# Patient Record
Sex: MALE | Race: WHITE | Employment: FULL TIME | URBAN - METROPOLITAN AREA
[De-identification: names, ages, dates, MRNs, and addresses within clinical notes are randomized per-mention and may not be internally consistent; named-entity substitution may affect disease eponyms.]

---

## 2019-12-26 ENCOUNTER — HOSPITAL ENCOUNTER (OUTPATIENT)
Dept: PHYSICAL THERAPY | Age: 54
Discharge: HOME | End: 2019-12-26
Payer: COMMERCIAL

## 2019-12-26 DIAGNOSIS — M54.32 BACK PAIN WITH LEFT-SIDED SCIATICA: ICD-10-CM

## 2019-12-26 DIAGNOSIS — M62.838 MUSCLE SPASM: ICD-10-CM

## 2019-12-26 PROCEDURE — 97110 THERAPEUTIC EXERCISES: CPT

## 2019-12-26 PROCEDURE — 97162 PT EVAL MOD COMPLEX 30 MIN: CPT

## 2019-12-26 NOTE — PROGRESS NOTES
Abdullahi Bhardwaj  : 1965  Payor: Sanna Parisi / Plan: [place] [facility] / Product Type: PPO /  [address] at [address], [place], [address] [place] [address]  [phone]   [phone]                                                               OUTPATIENT PHYSICAL THERAPY: Daily Treatment Note 2019   Visit Count:  1    ICD-10: Treatment Diagnosis:    Low back pain (M54.5)                             Sciatica, left side (M54.32)                Precautions/Allergies:   Betadine [povidone-iodine]   Fall Risk Score: 1 (? 5 = High Risk)  MD Orders: Eval and Treat  MEDICAL/REFERRING DIAGNOSIS:  Back pain with left-sided sciatica [M54.32]  Muscle spasm [M62.838]   DATE OF ONSET: 12/15/19  REFERRING PHYSICIAN: Rosalinda Suarez MD  RETURN PHYSICIAN APPOINTMENT: TBD         Pre-treatment Symptoms/Complaints:  Left sciatica  Pain: Initial:   4 Post Session:  3/10   Medications Last Reviewed:  2019   Updated Objective Findings: see evaluation   TREATMENT:   Therapeutic Exercises: (15 minutes):  Exercises per grid below to improve mobility, strength and balance. Required minimal visual, verbal and manual cues to promote proper body alignment and promote proper body posture. Progressed resistance and complexity of movement as indicated. Date:  19 Date:   Date:     Activity/Exercise Parameters Parameters Parameters   Patient education Treatment rational, expectations, POC, HEP     Prone on elbows 3 min     pressups 10 x     Standing extension 5 x     Supine sciatic nerve glide 20 x ea side     Seated slump nerve glide 10 x ea     Standing sciatic nerve glide 10 x left          Manual Therapy Interventions: (0 minutes): Manual interventions performed to improve joint/soft tissue mobility and ROM to improve ability to perform ADLs. Patient responded well to all manual interventions with no significant increase in pain/symptoms.  Improved pain reported below. Technique Used Grade  Level # Time(s) Effect while being performed                                                         [facility]  Treatment/Session Summary: understands program  · Response to Treatment:  No increase in pain or adverse reactions  · Communication/Consultation:  Faxed initial evaluation to MD  · Equipment provided today:  HEP. Recommendations/Intent for next treatment session: Next visit will focu  s on extension based program, sciatic nerve mobilization, encourage daily walking, continue all current work tasks.     Total Treatment Billable Duration:  15 min Rx 30 min evaluation  PT Patient Time In/Time Out  Time In: 1112  Time Out: 0830  Kimmie Cardenas RT    Future Appointments   Date Time Provider Koffi Joann   12/31/2019  9:30 AM Gregory Frank MD [facility]

## 2019-12-26 NOTE — THERAPY EVALUATION
Bernie Francois  : 1965      Payor: Thomas Reddy / [place]: [place] [facility] / Product Type: PPO /  [address] at [address], [place], [id number] [place] [address]  [phone]   [phone]       OUTPATIENT PHYSICAL THERAPY:Initial Assessment 2019    ICD-10: Treatment Diagnosis:    Low back pain (M54.5)        Sciatica, left side (M54.32)             Precautions/Allergies:   Betadine [povidone-iodine]   Fall Risk Score: 1 (? 5 = High Risk)  MD Orders: Eval and Treat  MEDICAL/REFERRING DIAGNOSIS:  Back pain with left-sided sciatica [M54.32]  Muscle spasm [M62.838]   DATE OF ONSET: 12/15/19  REFERRING PHYSICIAN: Yelitza Coates MD  RETURN PHYSICIAN APPOINTMENT: TBD     INITIAL ASSESSMENT:  Mr. Bernie Francois presents to physical therapy with decreased  ROM, joint mobility, flexibility, functional mobility, and increased pain. Patient complains of left sciatica increased by prolonged sitting and relieved by walking. These S/S are consistent with referring diagnosis. Bernie Francois will benefit from skilled physical therapy (medically necessary) to address above deficits affecting participation in basic ADLs and functional mobility/tolerance. Patient will benefit from manual therapeutic techniques (stretching, joint mobilizations, soft tissue mobilization/myofascial release), therapeutic exercises and activities, including a comprehensive home exercises program to address current impairments and functional limitations. PROBLEM LIST (Impacting functional limitations):  1. Decreased sitting tolerance  2. Decreased ADL/Functional Activities  3. Decreased Balance  4. Increased Pain  5. Decreased Activity Tolerance  6. Decreased Flexibility/Joint Mobility  7. Decreased [place] with Home Exercise Program INTERVENTIONS PLANNED:  1. Patient Education  2. Home Exercise Program (HEP)  3. Manual Therapy  4.  Neuromuscular Re-education/Strengthening  5. Range of Motion (ROM)  6. Therapeutic Activites  7. Therapeutic Exercise/Strengthening     TREATMENT PLAN:  Effective Dates: 12/26/19 TO 2/24/2020 (60 days). Frequency/Duration: 2 times a week for 60 Days  GOALS: (Goals have been discussed and agreed upon with patient.)  Short Term Goals 4 weeks   1. Kenya Garrett will be independent with HEP  2. Kenya Garrett will participate in LE stretching program to increase flexibility  3. Kenya Garrett will be able to sleep through night  4. Kenya Garrett will be able to sit more than a hour with minimal pain  5. Kenya Garrett will tolerate manual therapy/joint mobilizations/soft tissue to increase ROM and decrease pain  6. Kenya Garrett  Will be able to lift boxes at work without difficulty  Long Term Goals 8 weeks   1. Kenya Garrett will demonstrate a 10 point improvement on the Oswestry to show improvement in function  2. Kenya Garrett will report 0/10 pain at rest and during ADLs  3. Kenya Garrett will demonstrate 5/5 LE strength on manual muscle testing  4. Kenya Garrett will be able to perform all job demands without difficulty. Outcome Measure: Tool Used: Modified Oswestry Low Back Pain Questionnaire  Score:  Initial: 19/50  Most Recent: X/50 (Date: -- )   Interpretation of Score: Each section is scored on a 0-5 scale, 5 representing the greatest disability. The scores of each section are added together for a total score of 50. Medical Necessity:   · Skilled intervention continues to be required due to above deficits affecting participation in basic ADLs and overall functional tolerance. Reason for Services/Other Comments:  · Patient continues to require skilled intervention due to  above deficits affecting participation in basic ADLs and overall functional tolerance.     Total Treatment Duration:  PT Patient Time In/Time Out  Time In: 2911  Time Out: 0061    Rehabilitation Potential For Stated Goals: GOOD  Regarding Kit Menjivar's therapy, I certify that the treatment plan above will be carried out by a therapist or under their direction. Thank you for this referral,  RT Trevin     Referring Physician Signature: Columba Ramírez MD              Date                    HISTORY:   History of Present Injury/Illness (Reason for Referral): Woke up with severe muscle spasm left side of back two weeks. Also has left sciatica to lateral toes. Went to the ER on 12/15/19 due to 10/10 pain was given ibuprofen, tramadol, flexeril. Went to his MD on 12/17/19. Pain is gradually lessening, but is having only 4 hours of sleep. Has not had diagnostic testing at this time. No longer taking the Tramadol.    -Present symptoms/complaints (on day of evaluation) upper calf pain at this time. Pain Scale:  · Current: 4/10  · Best: 2/10  · Worst: 10+/10      · Aggravating factors: sitting, prolonged driving. · Relieving factors: walking      Past Medical History/Comorbidities:   Mr. Aniket Pantoja  has no past medical history on file. Mr. Aniket Pantoja  has a past surgical history that includes hx orthopaedic (Left); hx orthopaedic (Left); hx appendectomy; and hx other surgical.  Social History/Living Environment:    works for [employer] full time. If he has a lot of stops close together and he is getting out a lot he is doing ok.     Social History     Socioeconomic History    Marital status:      Spouse name: Not on file    Number of children: Not on file    Years of education: Not on file    Highest education level: Not on file   Occupational History    Occupation:      Employer: [address] [place] [address] Financial resource strain: Not on file    Food insecurity:     Worry: Not on file     Inability: Not on file    Transportation needs:     Medical: Not on file     Non-medical: Not on file   Tobacco Use    Smoking status: Current Every Day Smoker     Packs/day: 0.50    Smokeless tobacco: Never Used Substance and Sexual Activity    Alcohol use: Yes     Frequency: Never     Drinks per session: 1 or 2     Binge frequency: Never     Comment: very rare    Drug use: No    Sexual activity: Not on file   Lifestyle    Physical activity:     Days per week: Not on file     Minutes per session: Not on file    Stress: Not on file   Relationships    Social connections:     Talks on phone: Not on file     Gets together: Not on file     Attends Faith service: Not on file     Active member of club or organization: Not on file     Attends meetings of clubs or organizations: Not on file     Relationship status: Not on file    Intimate partner violence:     Fear of current or ex partner: Not on file     Emotionally abused: Not on file     Physically abused: Not on file     Forced sexual activity: Not on file   Other Topics Concern    Not on file   Social History Narrative    Not on file     Prior Level of Function/Work/Activity:  Has worked as a  for two years. No previous history of low back pain. Previous Treatment Approach  medication    Active Ambulatory Problems     Diagnosis Date Noted    No Active Ambulatory Problems     Resolved Ambulatory Problems     Diagnosis Date Noted    No Resolved Ambulatory Problems     No Additional Past Medical History     Note: Patient denies any increase of symptoms with cough, sneeze or valsalva. Patient denies any saddle paresthesia or bowel/bladder deficits. Current Medications:    Current Outpatient Medications:     cyclobenzaprine (FLEXERIL) 10 mg tablet, Take 10 mg by mouth., Disp: , Rfl:     predniSONE (DELTASONE) 10 mg tablet, , Disp: , Rfl:     traMADol (ULTRAM) 50 mg tablet, Take 1 Tab by mouth nightly as needed for Pain for up to 10 days. Max Daily Amount: 50 mg.  Indications: pain, Disp: 10 Tab, Rfl: 0      Ambulatory/Rehab Services H2 Model Falls Risk Assessment    Risk Factors:       (1)  Gender [Male] Ability to Rise from Chair:       (0) Ability to rise in a single movement    Falls Prevention Plan:       No modifications necessary   Total: (5 or greater = High Risk): 1    ©2010 [facility] [employer]. All Rights Reserved. [employer] [place] Patent #[id number]. Federal Law prohibits the replication, distribution or use without written permission from [facility] [place]       Date Last Reviewed:  12/26/2019   Number of Personal Factors/Comorbidities that affect the Plan of Care: 1-2: MODERATE COMPLEXITY   EXAMINATION:   Observation/Orthostatic Postural Assessment:          Normal postural presentation  Palpation:          Increased tone L>R lumbar paravertebrals but no pain. ROM:            AROM/PROM         Joint: Eval Date: 12/26/19  Re-Assess Date:  Re-Assess Date:    Active ROM RIGHT LEFT RIGHT LEFT RIGHT LEFT   Knee Extension WNL WNL       Knee Flexion WNL WNL       Hip Flexion         Hip Abduction         Lumbar Flexion 55 tightness        Lumbar Extension 20        Lumbar Side-bending 30 25       Lumbar Rotation           Strength:     Eval Date:  Re-Assess Date:  Re-Assess Date:      RIGHT LEFT RIGHT LEFT RIGHT LEFT   Knee Flexion (L5-S2) 5/5 5/5       Knee Extension (L3, L4) 5/5 5/5       Hip Flexion (L1, L2) 4/5 4/5       Hip Extension         Hip Abduction (L5, S1) 4/5 4/5       Ankle Dorsiflexion  5/5 5/5       Great Toe Extension (L5) 5 5       Ankle Plantar Flexion (S1-S2) 5 5           Single leg stance right/left: WNL              Deep squat: limited by calf tightness    Ham 90/90:   RIGHT LE: -30   LEFT LE: -45    Special Tests:    · SLR (<60 degrees)= right 60, left 45  · SLUMP= positive left      Joint Mobility Eval Date:  Re-Assess Date:  Re-Assess Date:     RIGHT LEFT RIGHT LEFT RIGHT LEFT   Lumbar Hypomobile L5S1 hypomobile       Hip WNL WNL         Neurological Screen:    RADIATING SYMPTOMS?: YES  · L5-S1 (Lateral foot)=   Functional Mobility:  Affecting participation in basic ADLs and functional tasks.      Body Structures Involved:    1. Joints   Body Functions Affected:    1. Neuromusculoskeletal  2. Movement Related Activities and Participation Affected:  1.  Bending, lifting, sitting, sleeping     Number of elements that affect the Plan of Care: 3: MODERATE COMPLEXITY   CLINICAL PRESENTATION:   Presentation: Evolving clinical presentation with changing clinical characteristics: MODERATE COMPLEXITY   CLINICAL DECISION MAKING:      Use of outcome tool(s) and clinical judgement create a POC that gives a: Clear prediction of patient's progress: LOW COMPLEXITY     See associated treatment note for treatment provided today      Future Appointments   Date Time Provider Koffi Contehi   12/31/2019  9:30 AM Mario Workman MD [facility]         Jules Montanez, PT C[facility]

## 2019-12-31 ENCOUNTER — HOSPITAL ENCOUNTER (OUTPATIENT)
Dept: PHYSICAL THERAPY | Age: 54
Discharge: HOME | End: 2019-12-31
Payer: COMMERCIAL

## 2019-12-31 PROCEDURE — 97110 THERAPEUTIC EXERCISES: CPT

## 2019-12-31 PROCEDURE — 97140 MANUAL THERAPY 1/> REGIONS: CPT

## 2019-12-31 NOTE — PROGRESS NOTES
Tayler Dodson  : 1965  Payor: Scout Fong / Plan: [facility] / Product Type: PPO /  Margret President at [address] [place] [address]. [address], [facility], [address] [place] [address]  [phone]   [phone]                                                               OUTPATIENT PHYSICAL THERAPY: Daily Treatment Note 2019   Visit Count:  2    ICD-10: Treatment Diagnosis:    Low back pain (M54.5)                             Sciatica, left side (M54.32)                Precautions/Allergies:   Betadine [povidone-iodine]   Fall Risk Score: 1 (? 5 = High Risk)  MD Orders: Eval and Treat  MEDICAL/REFERRING DIAGNOSIS:  Back pain with left-sided sciatica [M54.32]  Muscle spasm [M62.838]   DATE OF ONSET: 12/15/19  REFERRING PHYSICIAN: Wolf Garrison MD  RETURN PHYSICIAN APPOINTMENT: TBD         Pre-treatment Symptoms/Complaints: Pt reports a centralization of symptoms into his buttock and and back with no numbness in his toes. Pain: Initial:   2 Post Session:  1/10   Medications Last Reviewed:  2019   Updated Objective Findings:MIld lateral shift to the left     TREATMENT:   Therapeutic Exercises: (50 minutes):  Exercises per grid below to improve mobility, strength and balance. Required minimal visual, verbal and manual cues to promote proper body alignment and promote proper body posture. Progressed resistance and complexity of movement as indicated. Date:  19 Date:  2019 Date:     Activity/Exercise Parameters Parameters Parameters   Patient education Treatment rational, expectations, POC, HEP 8 min education on dosage of extension nerve glides.     Prone on elbows 3 min 6 min    pressups 10 x 8 min    Standing extension 5 x 25xs against table    Supine sciatic nerve glide 20 x ea side 30 seconds 3 times with education on dosage    Seated slump nerve glide 10 x ea     Standing sciatic nerve glide 10 x left     Lateral shifting  15xs    Lateral walking  Orange band 5 x's 20 ft    Standing hip extension  Orange band 30 xs bilaterally    rows  Purple band 30 xs         Manual Therapy Interventions: (8 minutes): Manual interventions performed to improve joint/soft tissue mobility and ROM to improve ability to perform ADLs. Patient responded well to all manual interventions with no significant increase in pain/symptoms. Improved pain reported below. Technique Used Grade  Level # Time(s) Effect while being performed   Lumbar II,III, V Lumbar 5 Good cavitation improved extension   PA with supine lumbar extension II,III lumbar 3 Improved extension                                           [employer] Portal  Treatment/Session Summary:   · Response to Treatment: improved with extension oriented exercises. Improved extension after manual and educated on dosage of sciatic nerve glides. Demonstrated centralization  With extension. · Communication/Consultation: none  · Equipment provided today: none  ·   Recommendations/Intent for next treatment session: Continuation of Extension Exercises.     Total Treatment Billable Duration:  58 min Rx  PT Patient Time In/Time Out  Time In: 7141  Time Out: Chula Goins 3, PT    Future Appointments   Date Time Provider Koffi David   1/3/2020  7:30 AM Papenfuss, Oley Landau, [facility]   1/7/2020  7:30 AM Symone Rivera, PT [facility]   1/10/2020  7:30 AM Symone Rivera, PT [facility]   1/14/2020  7:30 AM Symone Rivera, PT [facility]   1/17/2020  7:30 AM Symone Rivera, PT [facility]   1/21/2020  7:30 AM Symone Rivera, PT [facility]   1/24/2020  7:30 AM Symone Rivera, PT [facility]

## 2020-01-03 ENCOUNTER — HOSPITAL ENCOUNTER (OUTPATIENT)
Dept: PHYSICAL THERAPY | Age: 55
Discharge: HOME | End: 2020-01-03
Payer: COMMERCIAL

## 2020-01-03 PROCEDURE — 97110 THERAPEUTIC EXERCISES: CPT

## 2020-01-03 PROCEDURE — 97140 MANUAL THERAPY 1/> REGIONS: CPT

## 2020-01-03 NOTE — PROGRESS NOTES
Tayler Dodson  : 1965  Payor: Scout Fong / Plan: [facility] / Product Type: O /  [address] [facility] at [address] [place] [address]. [facility], [address], [facility], [address] [place] [address]  [phone]   [phone]                                                               OUTPATIENT PHYSICAL THERAPY: Daily Treatment Note 1/3/2020   Visit Count:  3    ICD-10: Treatment Diagnosis:    Low back pain (M54.5)                             Sciatica, left side (M54.32)                Precautions/Allergies:   Betadine [povidone-iodine]   Fall Risk Score: 1 (? 5 = High Risk)  MD Orders: Eval and Treat  MEDICAL/REFERRING DIAGNOSIS:  Back pain with left-sided sciatica [M54.32]  Muscle spasm [M62.838]   DATE OF ONSET: 12/15/19  REFERRING PHYSICIAN: Wolf Garrison MD  RETURN PHYSICIAN APPOINTMENT: TBD         Pre-treatment Symptoms/Complaints: Pt reports stiffness only today with active motion. Pain: Initial:   2 Post Session:  1/10   Medications Last Reviewed:  1/3/2020   Updated Objective Findings:   Lumbar spine Date:  1/3/20 Date:   Date:     Direction  Parameters Parameters Parameters   Flexion  70     Extension  25     Rotation  R:   L:      Side bending  R: 25  L: 30                         No shift noted today. TREATMENT:   Therapeutic Exercises: (40 minutes):  Exercises per grid below to improve mobility, strength and balance. Required minimal visual, verbal and manual cues to promote proper body alignment and promote proper body posture. Progressed resistance and complexity of movement as indicated. Date:  19 Date:  2019 Date:  1/3/20   Activity/Exercise Parameters Parameters Parameters   Patient education Treatment rational, expectations, POC, HEP 8 min education on dosage of extension nerve glides.     Prone on elbows 3 min 6 min 2 min    pressups 10 x 8 min 5 min with belt overpressure   Standing extension 5 x 25xs against table    Supine sciatic nerve glide 20 x ea side 30 seconds 3 times with education on dosage    Seated slump nerve glide 10 x ea  20 x ea side   Standing sciatic nerve glide 10 x left     Lateral shifting  15xs    Lateral walking  Orange band 5 x's 20 ft 40' x 2   Standing hip extension  Orange band 30 xs bilaterally 30 x bilat   rows  Purple band 30 xs 2 x 20 with squat   Treadmill    2.4 mph 8.30 min   Lower trunk rocking    20 x    Lumbar rotation with lower leg straight    10 x ea side rocking   Quadruped rocking   20 x   Hip hinge with pole   20 x        Manual Therapy Interventions: (8 minutes): Manual interventions performed to improve joint/soft tissue mobility and ROM to improve ability to perform ADLs. Patient responded well to all manual interventions with no significant increase in pain/symptoms. Improved pain reported below. Technique Used Grade  Level # Time(s) Effect while being performed   Lumbar II,III, V Lumbar 5 Good cavitation improved extension   PA with supine lumbar extension II,III lumbar 3 Improved extension                                           [employer] Portal  Treatment/Session Summary:   · Response to Treatment: improved with extension oriented exercises. Improved extension after manual and educated on dosage of sciatic nerve glides. Demonstrated centralization  With extension.    · Communication/Consultation: none  · Equipment provided today: HEP (copy also in chart)  ·   Recommendations/Intent for next treatment session: Continuation of Extension Exercises, add multi directional movement patterns as tolerated     Total Treatment Billable Duration: 50 min Rx  PT Patient Time In/Time Out  Time In: 0730  Time Out: 0820  RT Bonny    Future Appointments   Date Time Provider Koffi Davdi   1/7/2020  7:30 AM iLsa Ochoa PT [facility]   1/10/2020  7:30 AM Lisa Ochoa PT [facility]   1/14/2020  7:30 AM Lisa Ochoa PT [facility]   1/17/2020  7:30 AM Paula Callaway, DYLAN [facility]   1/21/2020  7:30 AM Paula Callaway PT [facility]   1/24/2020  7:30 AM [facility]

## 2020-01-07 ENCOUNTER — HOSPITAL ENCOUNTER (OUTPATIENT)
Dept: PHYSICAL THERAPY | Age: 55
Discharge: HOME | End: 2020-01-07
Payer: COMMERCIAL

## 2020-01-07 PROCEDURE — 97140 MANUAL THERAPY 1/> REGIONS: CPT

## 2020-01-07 PROCEDURE — 97110 THERAPEUTIC EXERCISES: CPT

## 2020-01-10 ENCOUNTER — HOSPITAL ENCOUNTER (OUTPATIENT)
Dept: PHYSICAL THERAPY | Age: 55
Discharge: HOME | End: 2020-01-10
Payer: COMMERCIAL

## 2020-01-10 PROCEDURE — 97140 MANUAL THERAPY 1/> REGIONS: CPT

## 2020-01-10 PROCEDURE — 97110 THERAPEUTIC EXERCISES: CPT

## 2020-01-10 NOTE — PROGRESS NOTES
Lanae Ganser  : 1965  Payor: Humaira Reaves / Plan: [facility] / Product Type: PPO /  Mary Spearing at [address] [facility], [address], [facility], [address] [place] [address]  [phone]   [phone]                                                               OUTPATIENT PHYSICAL THERAPY: Daily Treatment Note 1/10/2020   Visit Count:  5    ICD-10: Treatment Diagnosis:    Low back pain (M54.5)                             Sciatica, left side (M54.32)                Precautions/Allergies:   Betadine [povidone-iodine]   Fall Risk Score: 1 (? 5 = High Risk)  MD Orders: Eval and Treat  MEDICAL/REFERRING DIAGNOSIS:  Back pain with left-sided sciatica [M54.32]  Muscle spasm [M62.838]   DATE OF ONSET: 12/15/19  REFERRING PHYSICIAN: Radha Mayorga MD  RETURN PHYSICIAN APPOINTMENT: TBD         Pre-treatment Symptoms/Complaints: woke up with pain last night from buttock to calf  Pain: Initial:   3.5 Post Session:  2/10   Medications Last Reviewed:  1/10/2020   Updated Objective Findings:   Lumbar spine Date:  1/3/20 Date:  2020 Date:  1/10/20   Direction  Parameters Parameters Parameters   Flexion  70  75   Extension  25  25   Rotation  R:   L:      Side bending  R: 25  L: 30  R 25  L 25                       No shift noted today. Pain did not increase with forward bending. TREATMENT:   Therapeutic Exercises: (303.5 minutes):  Exercises per grid below to improve mobility, strength and balance. Required minimal visual, verbal and manual cues to promote proper body alignment and promote proper body posture. Progressed resistance and complexity of movement as indicated. Date:  19 Date:  2019 Date:  1/3/20 Date:  2020 Date  1/10/20   Activity/Exercise Parameters Parameters Parameters     Patient education Treatment rational, expectations, POC, HEP 8 min education on dosage of extension nerve glides.       Prone on elbows 3 min 6 min 2 min  3 min Prone lying in extension 5 min   pressups 10 x 8 min 5 min with belt overpressure 4 x10  2 x 10    Standing extension 5 x 25xs against table  3 x10 over table    Supine sciatic nerve glide 20 x ea side 30 seconds 3 times with education on dosage  3 sets 2 x 20 ea   Seated slump nerve glide 10 x ea  20 x ea side     Standing sciatic nerve glide 10 x left       Lateral shifting  15xs      Lateral walking  Orange band 5 x's 20 ft 40' x 2 40ft xs 3 orange band    Standing hip extension  Orange band 30 xs bilaterally 30 x bilat     rows  Purple band 30 xs 2 x 20 with squat  Purple band 30xs    Treadmill    2.4 mph 8.30 min  End of treatment 10 min   Lower trunk rocking    20 x      Lumbar rotation with lower leg straight    10 x ea side rocking     Quadruped rocking   20 x  2 min hip hinge  Quadriped to pressup 2 min   Hip hinge with pole   20 x     Upper trunk rotation    2x10                                 Manual Therapy Interventions: (12 minutes): Manual interventions performed to improve joint/soft tissue mobility and ROM to improve ability to perform ADLs. Patient responded well to all manual interventions with no significant increase in pain/symptoms. Improved pain reported below. Technique Used Grade  Level # Time(s) Effect while being performed   Lumbar II,III,  Lumbar 5 mobility          L leg manual traction   10 min Pain relief                                     [facility] Portal  Treatment/Session Summary:   · Response to Treatment: complaints of stretching only with forward bending. Decrease in pain with mobility work.    · Communication/Consultation: Patient encouraged to walk for exercise   · Equipment provided today: HEP (copy also in chart)  ·   Recommendations/Intent for next treatment session: Continuation of Extension Exercises, add multi directional movement patterns as tolerated     Total Treatment Billable Duration: 45 min Rx  PT Patient Time In/Time Out  Time In: 7232  Time Out: 6308 Arlin Cortese, RT    Future Appointments   Date Time Provider Koffi David   1/14/2020  7:30 AM Masoud Long, PT [facility] AND [facility]   1/17/2020  7:30 AM Masoud Long, PT [facility]   1/21/2020  7:30 AM Masoud Long, PT [facility]   1/24/2020  7:30 AM Masoud Long, PT [facility]

## 2020-01-14 ENCOUNTER — HOSPITAL ENCOUNTER (OUTPATIENT)
Dept: PHYSICAL THERAPY | Age: 55
Discharge: HOME | End: 2020-01-14
Payer: COMMERCIAL

## 2020-01-14 PROCEDURE — 97110 THERAPEUTIC EXERCISES: CPT

## 2020-01-14 PROCEDURE — 97140 MANUAL THERAPY 1/> REGIONS: CPT

## 2020-01-14 NOTE — PROGRESS NOTES
Gianluca Bustamante  : 1965  Payor: Ashlyn Summers / Plan: [facility] / Product Type: PPO /  [place] Zonia at [address] [facility], [address], [facility], [address] [place] [address]  [phone]   [phone]                                                               OUTPATIENT PHYSICAL THERAPY: Daily Treatment Note 2020   Visit Count:  6    ICD-10: Treatment Diagnosis:    Low back pain (M54.5)                             Sciatica, left side (M54.32)                Precautions/Allergies:   Betadine [povidone-iodine]   Fall Risk Score: 1 (? 5 = High Risk)  MD Orders: Eval and Treat  MEDICAL/REFERRING DIAGNOSIS:  Back pain with left-sided sciatica [M54.32]  Muscle spasm [M62.838]   DATE OF ONSET: 12/15/19  REFERRING PHYSICIAN: Lieutenant Christiane MD  RETURN PHYSICIAN APPOINTMENT: TBD         Pre-treatment Symptoms/Complaints: t reports that he finally slept well over well for a couple of hourse  Pain: Initial:   3.5 Post Session:  010   Medications Last Reviewed:  2020   Updated Objective Findings:   Lumbar spine Date:  1/3/20 Date:  2020 Date:  1/10/20    Direction  Parameters Parameters Parameters    Flexion  70  75    Extension  25  25    Rotation  R:   L:       Side bending  R: 25  L: 30  R 25  L 25                           No shift noted today. Pain did not increase with forward bending. TREATMENT:   Therapeutic Exercises: ( 44 minutes):  Exercises per grid below to improve mobility, strength and balance. Required minimal visual, verbal and manual cues to promote proper body alignment and promote proper body posture. Progressed resistance and complexity of movement as indicated. Date:  19 Date:  2019 Date:  1/3/20 Date:  2020 Date  1/10/20 Date  2020   Activity/Exercise Parameters Parameters Parameters      Patient education Treatment rational, expectations, POC, HEP 8 min education on dosage of extension nerve glides. Prone on elbows 3 min 6 min 2 min  3 min Prone lying in extension 5 min Prone lying in extension 5 min   pressups 10 x 8 min 5 min with belt overpressure 4 x10  2 x 10  2 x 10    Standing extension 5 x 25xs against table  3 x10 over table  25xs against table   Supine sciatic nerve glide 20 x ea side 30 seconds 3 times with education on dosage  3 sets 2 x 20 ea    Seated slump nerve glide 10 x ea  20 x ea side      Standing sciatic nerve glide 10 x left        Lateral shifting  15xs       Lateral walking  Orange band 5 x's 20 ft 40' x 2 40ft xs 3 orange band  25 3xs purple band   Standing hip extension  Orange band 30 xs bilaterally 30 x bilat      rows  Purple band 30 xs 2 x 20 with squat  Purple band 30xs     Treadmill    2.4 mph 8.30 min  End of treatment 10 min 10 min   Lower trunk rocking    20 x       Lumbar rotation with lower leg straight    10 x ea side rocking      Quadruped rocking   20 x  2 min hip hinge  Quadriped to pressup 2 min 2 min hip hinge  Quadriped to pressup 2 min   Hip hinge with pole   20 x      Upper trunk rotation    2x10     Piriformis      2 min   Monster walk      25ft x3  purple band   Thread the needle      15xs joaquin        Manual Therapy Interventions: (  8 minutes): Manual interventions performed to improve joint/soft tissue mobility and ROM to improve ability to perform ADLs. Patient responded well to all manual interventions with no significant increase in pain/symptoms. Improved pain reported below. Technique Used Grade  Level # Time(s) Effect while being performed   Manual Traction II,III Left leg 4 min    Lumbar roll IV,V Lumbar 2 Improved lumbar mobility   Soft tissue  piriformis 2                                     [facility]  Treatment/Session Summary:   · Response to Treatment: conitnued mobility work and extension traning to improve symptoms. Improve with soft tissue to piriformis that eased muscle tightness.    · Communication/Consultation: Patient encouraged to walk for exercise   · Equipment provided today: HEP (copy also in chart)  ·   Recommendations/Intent for next treatment session: Continuation of Extension Exercises, add multi directional movement patterns as tolerated     Total Treatment Billable Duration: 52 min Rx  PT Patient Time In/Time Out  Time In: 0730  Time Out: 657 Shira Randall PT    Future Appointments   Date Time Provider Koffi David   1/17/2020  7:30 AM Swathi Gibbons PT [facility] AND [facility]   1/21/2020  7:30 AM DYLAN Emmanuel [facility]   1/24/2020  7:30 AM DYLAN Emmanuel [facility]

## 2020-01-17 ENCOUNTER — HOSPITAL ENCOUNTER (OUTPATIENT)
Dept: PHYSICAL THERAPY | Age: 55
Discharge: HOME | End: 2020-01-17
Payer: COMMERCIAL

## 2020-01-17 PROCEDURE — 97530 THERAPEUTIC ACTIVITIES: CPT

## 2020-01-17 PROCEDURE — 97110 THERAPEUTIC EXERCISES: CPT

## 2020-01-17 NOTE — PROGRESS NOTES
Sanjay Smart  : 1965  Payor: Tiara Cooper / Plan: [place] [facility] / Product Type: PPO /  [address] at [address] [facility], [address], [facility], [address] [place] [address]  [phone]   [phone]                                                               OUTPATIENT PHYSICAL THERAPY: Daily Treatment Note 2020   Visit Count:  7    ICD-10: Treatment Diagnosis:    Low back pain (M54.5)                             Sciatica, left side (M54.32)                Precautions/Allergies:   Betadine [povidone-iodine]   Fall Risk Score: 1 (? 5 = High Risk)  MD Orders: Eval and Treat  MEDICAL/REFERRING DIAGNOSIS:  Back pain with left-sided sciatica [M54.32]  Muscle spasm [M62.838]   DATE OF ONSET: 12/15/19  REFERRING PHYSICIAN: Kerri Pozo MD  RETURN PHYSICIAN APPOINTMENT: TBD         Pre-treatment Symptoms/Complaints: Pt reports that he tripped at his job and injured his ribs. Pt reports soreness in his ribs today but slight improvement. Pain: Initial:   3.5 Post Session:  0/10   Medications Last Reviewed:  2020   Updated Objective Findings:   Lumbar spine Date:  1/3/20 Date:  2020 Date:  1/10/20 Date 2020   Direction  Parameters Parameters Parameters    Flexion  70  75 90   Extension  25  25 26   Rotation  R:   L:       Side bending  R: 25  L: 30  R 25  L 25                           No shift noted today. Pain did not increase with forward bending. TREATMENT:   Therapeutic Exercises: ( 54 minutes):  Exercises per grid below to improve mobility, strength and balance. Required minimal visual, verbal and manual cues to promote proper body alignment and promote proper body posture. Progressed resistance and complexity of movement as indicated.      Date:  19 Date:  2019 Date:  1/3/20 Date:  2020 Date  1/10/20 Date  2020 Date     2020   Activity/Exercise Parameters Parameters Parameters       Patient education Treatment rational, expectations, POC, HEP 8 min education on dosage of extension nerve glides. Prone on elbows 3 min 6 min 2 min  3 min Prone lying in extension 5 min Prone lying in extension 5 min Prone lying in extension 5 min   pressups 10 x 8 min 5 min with belt overpressure 4 x10  2 x 10  2 x 10  2 x 10    Standing extension 5 x 25xs against table  3 x10 over table  25xs against table 25xs against table   Supine sciatic nerve glide 20 x ea side 30 seconds 3 times with education on dosage  3 sets 2 x 20 ea     Seated slump nerve glide 10 x ea  20 x ea side       Standing sciatic nerve glide 10 x left         Lateral shifting  15xs        Lateral walking  Orange band 5 x's 20 ft 40' x 2 40ft xs 3 orange band  25 3xs purple band 25 3xs purple band   Standing hip extension  Orange band 30 xs bilaterally 30 x bilat       rows  Purple band 30 xs 2 x 20 with squat  Purple band 30xs      Treadmill    2.4 mph 8.30 min  End of treatment 10 min 10 min 10 min   Lower trunk rocking    20 x     20 x   Lumbar rotation with lower leg straight    10 x ea side rocking       Quadruped rocking   20 x  2 min hip hinge  Quadriped to pressup 2 min 2 min hip hinge  Quadriped to pressup 2 min 2 min hip hinge  Quadriped to pressup 2 min   Hip hinge with pole   20 x       Upper trunk rotation    2x10   2x10 each side   Piriformis      2 min 2 min   Monster walk      25ft x3  purple band    Thread the needle      15xs joaquin    standng shoulder flexion stretch       2 min   deadlifts       Elevated 85# 2x10   SL Hebrew Deadlifts       30lbs 3x10   planks       30 seconds 3xs                  Manual Therapy Interventions: (  0 minutes): Manual interventions performed to improve joint/soft tissue mobility and ROM to improve ability to perform ADLs. Patient responded well to all manual interventions with no significant increase in pain/symptoms. Improved pain reported below.        Technique Used Grade  Level # Time(s) Effect while being performed                                                         [facility] Portal  Treatment/Session Summary:   · Response to Treatment: Pt progressed with loaded flexion exercises to knee level. Pt tolerated progressions well and continues to respond favrably to elf release of piriformis. No pian with exercises or soreness post exercises.   · Communication/Consultation: Patient encouraged to walk for exercise   · Equipment provided today: none  ·   Recommendations/Intent for next treatment session: Progress loaded flexion exercises    Total Treatment Billable Duration: 54 min Rx  PT Patient Time In/Time Out  Time In: 0729  Time Out: Sean Welch PT    Future Appointments   Date Time Provider Koffi David   1/21/2020  7:30 AM Artem Iqbal, PT [facility] AND [facility]   1/24/2020  7:30 AM [facility]

## 2020-01-21 ENCOUNTER — HOSPITAL ENCOUNTER (OUTPATIENT)
Dept: PHYSICAL THERAPY | Age: 55
Discharge: HOME | End: 2020-01-21
Payer: COMMERCIAL

## 2020-01-21 PROCEDURE — 97110 THERAPEUTIC EXERCISES: CPT

## 2020-01-21 PROCEDURE — 97530 THERAPEUTIC ACTIVITIES: CPT

## 2020-01-21 NOTE — PROGRESS NOTES
Lucía iWli  : 1965  Payor: Jose A Arita / Plan: [facility] / Product Type: PPO /  Lavella Spanner at [address] [facility], [address], [facility], [address] [place] [address]  [phone]   [phone]                                                               OUTPATIENT PHYSICAL THERAPY: Daily Treatment Note 2020   Visit Count:  8    ICD-10: Treatment Diagnosis:    Low back pain (M54.5)                             Sciatica, left side (M54.32)                Precautions/Allergies:   Betadine [povidone-iodine]   Fall Risk Score: 1 (? 5 = High Risk)  MD Orders: Eval and Treat  MEDICAL/REFERRING DIAGNOSIS:  Back pain with left-sided sciatica [M54.32]  Muscle spasm [M62.838]   DATE OF ONSET: 12/15/19  REFERRING PHYSICIAN: Solomon Martinez MD  RETURN PHYSICIAN APPOINTMENT: TBD         Pre-treatment Symptoms/Complaints: Pt reports that his back is doing well and he doesn't have a ton pain going down the leg. Pain: Initial:   3.5 Post Session:  0/10   Medications Last Reviewed:  2020   Updated Objective Findings:   Lumbar spine Date:  1/3/20 Date:  2020 Date:  1/10/20 Date 2020 Date 2020   Direction  Parameters Parameters Parameters     Flexion  70  75 90 102   Extension  25  25 26    Rotation  R:   L:        Side bending  R: 25  L: 30  R 25  L 25                               No shift noted today. Pain did not increase with forward bending. TREATMENT:   Therapeutic Exercises/Activity: ( 46 minutes):  Exercises per grid below to improve mobility, strength and balance. Required minimal visual, verbal and manual cues to promote proper body alignment and promote proper body posture. Progressed resistance and complexity of movement as indicated.      Date:  2019 Date:  1/3/20 Date:  2020 Date  1/10/20 Date  2020 Date     2020 Date   2020   Activity/Exercise Parameters Parameters        Patient education 8 min education on dosage of extension nerve glides.          Prone on elbows 6 min 2 min  3 min Prone lying in extension 5 min Prone lying in extension 5 min Prone lying in extension 5 min Prone lying in extension 5 min   pressups 8 min 5 min with belt overpressure 4 x10  2 x 10  2 x 10  2 x 10  2 x 10    Standing extension 25xs against table  3 x10 over table  25xs against table 25xs against table    Supine sciatic nerve glide 30 seconds 3 times with education on dosage  3 sets 2 x 20 ea      Seated slump nerve glide  20 x ea side        Standing sciatic nerve glide          Lateral shifting 15xs         Lateral walking Orange band 5 x's 20 ft 40' x 2 40ft xs 3 orange band  25 3xs purple band 25 3xs purple band 25 3xs purple band   Standing hip extension Orange band 30 xs bilaterally 30 x bilat        rows Purple band 30 xs 2 x 20 with squat  Purple band 30xs    Purple band 30xs   Treadmill   2.4 mph 8.30 min  End of treatment 10 min 10 min 10 min 10 min   Lower trunk rocking   20 x     20 x    Lumbar rotation with lower leg straight   10 x ea side rocking        Quadruped rocking  20 x  2 min hip hinge  Quadriped to pressup 2 min 2 min hip hinge  Quadriped to pressup 2 min 2 min hip hinge  Quadriped to pressup 2 min 2 min hip hinge  Quadriped to pressup 2 min   Hip hinge with pole  20 x        Upper trunk rotation   2x10   2x10 each side    Piriformis     2 min 2 min    Monster walk     25ft x3  purple band  25ft x3  purple band   Thread the needle     15xs joaquin     standng shoulder flexion stretch      2 min    deadlifts      Elevated 85# 2x10    SL Upper sorbian Deadlifts      30lbs 3x10 30lbs 3x10   planks      30 seconds 3xs    Shuttle       5 black bands 1 red 3x10   [place] Kettle bells       2x15 30 lbs   [place] get ups       Held due to rib probem                            Manual Therapy Interventions: (  0 minutes): Manual interventions performed to improve joint/soft tissue mobility and ROM to improve ability to perform ADLs. Patient responded well to all manual interventions with no significant increase in pain/symptoms. Improved pain reported below. Technique Used Grade  Level # Time(s) Effect while being performed                                                         [facility] Portal  Treatment/Session Summary:   · Response to Treatment: Progressed with loaded flexion exercises. Pt  Tolerated progressions well which included some closed chain sciatic nerve tensioners with no reproduction of pain.   · Communication/Consultation: Patient encouraged to walk for exercise   · Equipment provided today: none  ·   Recommendations/Intent for next treatment session: Progress loaded flexion exercises    Total Treatment Billable Duration: 46 min Rx  PT Patient Time In/Time Out  Time In: 0730  Time Out: 176 [facility], PT    Future Appointments   Date Time Provider Koffi David   1/24/2020  7:30 AM Jaspal Sierra, PT [facility] AND [facility]

## 2024-08-08 ENCOUNTER — APPOINTMENT (RX ONLY)
Dept: URBAN - METROPOLITAN AREA CLINIC 25 | Facility: CLINIC | Age: 59
Setting detail: DERMATOLOGY
End: 2024-08-08

## 2024-08-08 DIAGNOSIS — D485 NEOPLASM OF UNCERTAIN BEHAVIOR OF SKIN: ICD-10-CM

## 2024-08-08 PROBLEM — D48.5 NEOPLASM OF UNCERTAIN BEHAVIOR OF SKIN: Status: ACTIVE | Noted: 2024-08-08

## 2024-08-08 PROCEDURE — ? BIOPSY BY SHAVE METHOD

## 2024-08-08 PROCEDURE — 11102 TANGNTL BX SKIN SINGLE LES: CPT

## 2024-08-08 ASSESSMENT — LOCATION SIMPLE DESCRIPTION DERM: LOCATION SIMPLE: LEFT CHEEK

## 2024-08-08 ASSESSMENT — LOCATION DETAILED DESCRIPTION DERM: LOCATION DETAILED: LEFT MEDIAL MALAR CHEEK

## 2024-08-08 ASSESSMENT — LOCATION ZONE DERM: LOCATION ZONE: FACE

## 2024-08-08 NOTE — HPI: SKIN LESION
What Type Of Note Output Would You Prefer (Optional)?: Standard Output
How Severe Is Your Skin Lesion?: mild
Has Your Skin Lesion Been Treated?: not been treated
Is This A New Presentation, Or A Follow-Up?: Growth
Additional History: Patient states that his oncologist recommended he have lesion evaluated. It is scabby and will bleed when traumatized.

## 2024-08-08 NOTE — PROCEDURE: BIOPSY BY SHAVE METHOD
Detail Level: Detailed
Depth Of Biopsy: dermis
Was A Bandage Applied: Yes
Size Of Lesion In Cm: 0
Biopsy Type: H and E
Biopsy Method: Dermablade
Anesthesia Type: 1% lidocaine with epinephrine
Anesthesia Volume In Cc: 0.5
Hemostasis: Drysol
Wound Care: Petrolatum
Dressing: bandage
Destruction After The Procedure: No
Type Of Destruction Used: Curettage
Curettage Text: The wound bed was treated with curettage after the biopsy was performed.
Cryotherapy Text: The wound bed was treated with cryotherapy after the biopsy was performed.
Electrodesiccation Text: The wound bed was treated with electrodesiccation after the biopsy was performed.
Electrodesiccation And Curettage Text: The wound bed was treated with electrodesiccation and curettage after the biopsy was performed.
Silver Nitrate Text: The wound bed was treated with silver nitrate after the biopsy was performed.
Lab: [id number]
Lab Facility: [id number]
Consent: Written consent was obtained and risks were reviewed including but not limited to scarring, infection, bleeding, scabbing, incomplete removal, nerve damage and allergy to anesthesia.
Post-Care Instructions: I reviewed with the patient in detail post-care instructions. Patient is to keep the biopsy site dry overnight, and then apply bacitracin twice daily until healed. Patient may apply hydrogen peroxide soaks to remove any crusting.
Notification Instructions: Patient will be notified of biopsy results. However, patient instructed to call the office if not contacted within 2 weeks.
Billing Type: Third-Party Bill
Information: Selecting Yes will display possible errors in your note based on the variables you have selected. This validation is only offered as a suggestion for you. PLEASE NOTE THAT THE VALIDATION TEXT WILL BE REMOVED WHEN YOU FINALIZE YOUR NOTE. IF YOU WANT TO FAX A PRELIMINARY NOTE YOU WILL NEED TO TOGGLE THIS TO 'NO' IF YOU DO NOT WANT IT IN YOUR FAXED NOTE.